# Patient Record
Sex: FEMALE | Race: WHITE | URBAN - NONMETROPOLITAN AREA
[De-identification: names, ages, dates, MRNs, and addresses within clinical notes are randomized per-mention and may not be internally consistent; named-entity substitution may affect disease eponyms.]

---

## 2021-08-30 ENCOUNTER — TELEPHONE (OUTPATIENT)
Dept: OBGYN CLINIC | Age: 21
End: 2021-08-30

## 2021-08-30 NOTE — TELEPHONE ENCOUNTER
Called pt and left a VM letting her know we did get the request for the medication refill,the and the last time we had seen her was 7/2020 and we needed to get you on the books before we can send that request in.

## 2021-08-30 NOTE — TELEPHONE ENCOUNTER
Refills denied at pharmacy until appointment scheduled. Will await further communication from patient.

## 2021-08-30 NOTE — TELEPHONE ENCOUNTER
Received a fax from [facility] [address] [facility] that patient is needing refills of her Sprintec. She was last seen 7/2020 and nothing scheduled for 2021. Can you please call her and schedule an annual appointment and I can talk to the provider about getting refills called in? Thanks.

## 2021-09-08 ENCOUNTER — TELEPHONE (OUTPATIENT)
Dept: OBGYN CLINIC | Age: 21
End: 2021-09-08

## 2021-09-08 RX ORDER — NORGESTIMATE AND ETHINYL ESTRADIOL 0.25-0.035
1 KIT ORAL DAILY
Qty: 1 PACKET | Refills: 1 | Status: SHIPPED | OUTPATIENT
Start: 2021-09-08 | End: 2021-10-18 | Stop reason: SDUPTHER

## 2021-09-08 NOTE — TELEPHONE ENCOUNTER
Pt called to sched annual. Needs a refill on meds until that time if possible. She uses [facility] [address] [facility]. Please and thanks.

## 2021-10-18 ENCOUNTER — OFFICE VISIT (OUTPATIENT)
Dept: OBGYN CLINIC | Age: 21
End: 2021-10-18
Payer: COMMERCIAL

## 2021-10-18 VITALS
BODY MASS INDEX: 29.64 KG/M2 | DIASTOLIC BLOOD PRESSURE: 82 MMHG | HEIGHT: 68 IN | WEIGHT: 195.6 LBS | SYSTOLIC BLOOD PRESSURE: 129 MMHG

## 2021-10-18 DIAGNOSIS — Z01.419 WOMEN'S ANNUAL ROUTINE GYNECOLOGICAL EXAMINATION: Primary | ICD-10-CM

## 2021-10-18 DIAGNOSIS — N92.6 IRREGULAR MENSES: ICD-10-CM

## 2021-10-18 PROCEDURE — G8484 FLU IMMUNIZE NO ADMIN: HCPCS | Performed by: NURSE PRACTITIONER

## 2021-10-18 PROCEDURE — 99395 PREV VISIT EST AGE 18-39: CPT | Performed by: NURSE PRACTITIONER

## 2021-10-18 RX ORDER — NORGESTIMATE AND ETHINYL ESTRADIOL 0.25-0.035
1 KIT ORAL DAILY
Qty: 3 PACKET | Refills: 3 | Status: SHIPPED | OUTPATIENT
Start: 2021-10-18 | End: 2022-10-03

## 2021-10-18 NOTE — PROGRESS NOTES
YEARLY PHYSICAL    Date of service: 10/18/2021    Saeed Palma  Is a 19 y.o.  female    PT's PCP is: No primary care provider on file. : 2000                                         Chaperone for Intimate Exam   Chaperone was offered as part of the rooming process. Patient declined and agrees to continue with exam without a chaperone.  Chaperone: n/a      Subjective:       Patient's last menstrual period was 10/04/2021. Are your menses regular: yes    OB History    Para Term  AB Living   0 0 0 0 0 0   SAB TAB Ectopic Molar Multiple Live Births   0 0 0 0 0 0        Social History     Tobacco Use   Smoking Status Never Smoker   Smokeless Tobacco Never Used        Social History     Substance and Sexual Activity   Alcohol Use Yes    Comment: rare       Family History   Problem Relation Age of Onset    Stroke Paternal Grandfather     Breast Cancer Paternal Aunt        Any family history of breast or ovarian cancer: Yes    Any family history of blood clots: No      Allergies: Patient has no known allergies. Current Outpatient Medications:     norgestimate-ethinyl estradiol (ORTHO-CYCLEN, 28,) 0.25-35 MG-MCG per tablet, Take 1 tablet by mouth daily, Disp: 3 packet, Rfl: 3    Social History     Substance and Sexual Activity   Sexual Activity Yes    Partners: Male    Birth control/protection: OCP       Any bleeding or pain with intercourse: No    Last Yearly:  2020    Last pap: 2020    Last HPV: n/a    Do you do self breast exams: Yes    History reviewed. No pertinent past medical history. History reviewed. No pertinent surgical history. Family History   Problem Relation Age of Onset    Stroke Paternal Grandfather     Breast Cancer Paternal Aunt        Chief Complaint   Patient presents with    Gynecologic Exam     Last pap 20. Reports regular cycles with OCP. Voices no concerns.  Declines std testing. PE:  Vital Signs  Blood pressure 129/82, height 5' 8\" (1.727 m), weight 195 lb 9.6 oz (88.7 kg), last menstrual period 10/04/2021, not currently breastfeeding. Estimated body mass index is 29.74 kg/m² as calculated from the following:    Height as of this encounter: 5' 8\" (1.727 m). Weight as of this encounter: 195 lb 9.6 oz (88.7 kg). HPI: Patient presents today for annual exam. Feeling well, voices no concerns. Denies breast/pelvic pain. Negative pap 7/2020. Wellness reviewed. Reports monthly menses with OCP. 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 Review of Systems   All other systems reviewed and are negative. Physical Exam  Constitutional:       General: She is not in acute distress. Appearance: Normal appearance. She is not ill-appearing. Genitourinary:      Vulva, cervix, uterus, right adnexa and left adnexa normal.      No vaginal discharge. Uterus is not tender. Right adnexa not tender. Left adnexa not tender. HENT:      Head: Normocephalic. Cardiovascular:      Rate and Rhythm: Normal rate and regular rhythm. Pulmonary:      Effort: Pulmonary effort is normal.      Breath sounds: Normal breath sounds. Chest:      Breasts:         Right: No mass, nipple discharge, skin change or tenderness. Left: No mass, nipple discharge, skin change or tenderness. Abdominal:      Palpations: Abdomen is soft. Tenderness: There is no abdominal tenderness. There is no guarding or rebound. Musculoskeletal:         General: Normal range of motion. Neurological:      General: No focal deficit present. Mental Status: She is alert. Psychiatric:         Mood and Affect: Mood normal.         Behavior: Behavior normal.                            Assessment and Plan          Diagnosis Orders   1. Women's annual routine gynecological examination     2.  Irregular menses  norgestimate-ethinyl estradiol (ORTHO-CYCLEN, 28,) 0.25-35 MG-MCG per tablet       Repeat Annual every 1 year  Cervical Cytology Evaluation begins at 24years old. If Negative Cytology, Follow-up screening per current guidelines. Mammograms every 1year. If 35 yo and last mammogram was negative. Routine healthmaintenance per patients PCP. I am having Steph Babin maintain her norgestimate-ethinyl estradiol. Return in about 1 year (around 10/18/2022) for yearly. She was also counseled on her preventative health maintenance recommendations and follow-up. There are no Patient Instructions on file for this visit.     NICKI Scott NP,10/18/2021 11:44 AM

## 2021-12-06 ENCOUNTER — OFFICE VISIT (OUTPATIENT)
Dept: OBGYN CLINIC | Age: 21
End: 2021-12-06
Payer: COMMERCIAL

## 2021-12-06 ENCOUNTER — HOSPITAL ENCOUNTER (OUTPATIENT)
Age: 21
Setting detail: SPECIMEN
Discharge: HOME | End: 2021-12-06

## 2021-12-06 VITALS — SYSTOLIC BLOOD PRESSURE: 127 MMHG | DIASTOLIC BLOOD PRESSURE: 83 MMHG | BODY MASS INDEX: 29.35 KG/M2 | WEIGHT: 193 LBS

## 2021-12-06 DIAGNOSIS — N83.291 COMPLEX CYST OF RIGHT OVARY: ICD-10-CM

## 2021-12-06 DIAGNOSIS — N83.291 COMPLEX CYST OF RIGHT OVARY: Primary | ICD-10-CM

## 2021-12-06 LAB — HCG QUANTITATIVE: <1 IU/L

## 2021-12-06 PROCEDURE — G8484 FLU IMMUNIZE NO ADMIN: HCPCS | Performed by: NURSE PRACTITIONER

## 2021-12-06 PROCEDURE — 1036F TOBACCO NON-USER: CPT | Performed by: NURSE PRACTITIONER

## 2021-12-06 PROCEDURE — 99213 OFFICE O/P EST LOW 20 MIN: CPT | Performed by: NURSE PRACTITIONER

## 2021-12-06 PROCEDURE — G8427 DOCREV CUR MEDS BY ELIG CLIN: HCPCS | Performed by: NURSE PRACTITIONER

## 2021-12-06 PROCEDURE — G8419 CALC BMI OUT NRM PARAM NOF/U: HCPCS | Performed by: NURSE PRACTITIONER

## 2021-12-06 NOTE — PROGRESS NOTES
PROBLEM VISIT     Date of service: 2021    Lorena Brasher  Is a 19 y.o. female    PT's PCP is: No primary care provider on file. : 2000                                             Subjective:       Patient's last menstrual period was 2021. OB History    Para Term  AB Living   0 0 0 0 0 0   SAB IAB Ectopic Molar Multiple Live Births   0 0 0 0 0 0        Social History     Tobacco Use   Smoking Status Never Smoker   Smokeless Tobacco Never Used        Social History     Substance and Sexual Activity   Alcohol Use Yes    Comment: rare       Allergies: Patient has no known allergies. Current Outpatient Medications:     norgestimate-ethinyl estradiol (ORTHO-CYCLEN, 28,) 0.25-35 MG-MCG per tablet, Take 1 tablet by mouth daily, Disp: 3 packet, Rfl: 3    Social History     Substance and Sexual Activity   Sexual Activity Yes    Partners: Male    Birth control/protection: OCP       Chief Complaint   Patient presents with    Follow-up     Follow up usn to recheck ovary. Reports was seen in ER recently for kidney stones and has only passed 1,         PE:  Vital Signs  Blood pressure 127/83, weight 193 lb (87.5 kg), last menstrual period 2021, not currently breastfeeding. HPI: Patient here following ultrasound. Recent CT scan performed for pain related to kidney stones revealed enlarged ovary. Denies any pelvic pain. Passed 1 of 3 kidney stones, no current pain. PT denies fever, chills, nausea and vomiting       Review of Systems   Constitutional: Negative. Gastrointestinal: Negative. Genitourinary: Negative. Physical Exam  Constitutional:       Appearance: Normal appearance. HENT:      Head: Normocephalic. Pulmonary:      Effort: Pulmonary effort is normal.   Musculoskeletal:         General: Normal range of motion. Neurological:      General: No focal deficit present. Mental Status: She is alert.    Psychiatric:         Mood and Affect: Mood normal.         Behavior: Behavior normal.        Uterus measures: 8.5 x 4.6  x 3.9 cm  Endometrium measures: 7.1 mm  Complex appearing area with blood flow is seen on the right ovary juwan: 2.3 x 0.9 x 1.9cm  Left ovary appears WNL    Assessment and Plan          Diagnosis Orders   1. Complex cyst of right ovary  hCG, Quantitative, Pregnancy       usn reviewed. Patient denies any current pain. Reviewed reportable s/s and when to seek care. I am having Radha Olivas maintain her norgestimate-ethinyl estradiol. Return in about 3 months (around 3/6/2022) for gyn US recheck cyst.    There are no Patient Instructions on file for this visit. Over 50% of time spent on counseling and care coordination on: see assessment and plan,  She was also counseled on her preventative health maintenance recommendations and follow-up.         FF time: 20 min      NICKI Lopez NP,12/13/2021 7:21 PM

## 2021-12-13 ASSESSMENT — ENCOUNTER SYMPTOMS: GASTROINTESTINAL NEGATIVE: 1

## 2022-03-08 ENCOUNTER — OFFICE VISIT (OUTPATIENT)
Dept: OBGYN CLINIC | Age: 22
End: 2022-03-08
Payer: COMMERCIAL

## 2022-03-08 VITALS — WEIGHT: 192 LBS | DIASTOLIC BLOOD PRESSURE: 82 MMHG | BODY MASS INDEX: 29.19 KG/M2 | SYSTOLIC BLOOD PRESSURE: 117 MMHG

## 2022-03-08 DIAGNOSIS — N83.291 COMPLEX CYST OF RIGHT OVARY: Primary | ICD-10-CM

## 2022-03-08 PROCEDURE — G8419 CALC BMI OUT NRM PARAM NOF/U: HCPCS | Performed by: NURSE PRACTITIONER

## 2022-03-08 PROCEDURE — G8484 FLU IMMUNIZE NO ADMIN: HCPCS | Performed by: NURSE PRACTITIONER

## 2022-03-08 PROCEDURE — G8427 DOCREV CUR MEDS BY ELIG CLIN: HCPCS | Performed by: NURSE PRACTITIONER

## 2022-03-08 PROCEDURE — 99213 OFFICE O/P EST LOW 20 MIN: CPT | Performed by: NURSE PRACTITIONER

## 2022-03-08 PROCEDURE — 1036F TOBACCO NON-USER: CPT | Performed by: NURSE PRACTITIONER

## 2022-03-08 NOTE — PROGRESS NOTES
PROBLEM VISIT     Date of service: 3/8/2022    Vignesh Chicas  Is a 19 y.o. female    PT's PCP is: No primary care provider on file. : 2000                                             Subjective:       Patient's last menstrual period was 2022. OB History    Para Term  AB Living   0 0 0 0 0 0   SAB IAB Ectopic Molar Multiple Live Births   0 0 0 0 0 0        Social History     Tobacco Use   Smoking Status Never Smoker   Smokeless Tobacco Never Used        Social History     Substance and Sexual Activity   Alcohol Use Yes    Comment: rare       Allergies: Patient has no known allergies. Current Outpatient Medications:     norgestimate-ethinyl estradiol (ORTHO-CYCLEN, 28,) 0.25-35 MG-MCG per tablet, Take 1 tablet by mouth daily, Disp: 3 packet, Rfl: 3    Social History     Substance and Sexual Activity   Sexual Activity Yes    Partners: Male    Birth control/protection: OCP       Chief Complaint   Patient presents with    Follow-up     Follow up usn for right ovarian cyst. Denies any pain. PE:  Vital Signs  Blood pressure 117/82, weight 192 lb (87.1 kg), last menstrual period 2022, not currently breastfeeding. HPI: Patient here following ultrasound to recheck complex right ovarian cyst. Denies any pelvic pain. PT denies fever, chills, nausea and vomiting       Review of Systems   Constitutional: Negative. Genitourinary: Negative. Physical Exam  Constitutional:       Appearance: Normal appearance. HENT:      Head: Normocephalic. Pulmonary:      Effort: Pulmonary effort is normal.   Musculoskeletal:         General: Normal range of motion. Neurological:      General: No focal deficit present. Mental Status: She is alert. Psychiatric:         Mood and Affect: Mood normal.         Behavior: Behavior normal.         Assessment and Plan          Diagnosis Orders   1. Complex cyst of right ovary         ultrasound reviewed with patient. ovaries WNL       I am having Juani Banuelos maintain her norgestimate-ethinyl estradiol. Return if symptoms worsen or fail to improve. There are no Patient Instructions on file for this visit. Over 50% of time spent on counseling and care coordination on: see assessment and plan,  She was also counseled on her preventative health maintenance recommendations and follow-up.         FF time: 20 min      Merrick Heimlich, APRN - ROBERT,3/8/2022 1:54 PM

## 2022-10-01 DIAGNOSIS — N92.6 IRREGULAR MENSES: ICD-10-CM

## 2022-10-03 ENCOUNTER — TELEPHONE (OUTPATIENT)
Dept: OBGYN CLINIC | Age: 22
End: 2022-10-03

## 2022-10-03 RX ORDER — NORGESTIMATE AND ETHINYL ESTRADIOL 0.25-0.035
1 KIT ORAL DAILY
Qty: 3 PACKET | Refills: 0 | Status: SHIPPED | OUTPATIENT
Start: 2022-10-03

## 2022-10-03 NOTE — TELEPHONE ENCOUNTER
Refills were sent this morning from an electronic request and patient called and changed her pharmacy. Refills sent to [facility] in [place].

## 2022-10-03 NOTE — TELEPHONE ENCOUNTER
Pt called to conf change of appt and will need one refill of meds sent to [facility] in [place]. Pt lives in [place] now. Please and thanks!

## 2023-01-04 ENCOUNTER — HOSPITAL ENCOUNTER (OUTPATIENT)
Age: 23
Setting detail: SPECIMEN
Discharge: HOME | End: 2023-01-04

## 2023-01-04 ENCOUNTER — OFFICE VISIT (OUTPATIENT)
Dept: OBGYN CLINIC | Age: 23
End: 2023-01-04
Payer: COMMERCIAL

## 2023-01-04 VITALS
SYSTOLIC BLOOD PRESSURE: 124 MMHG | BODY MASS INDEX: 29.13 KG/M2 | HEIGHT: 68 IN | WEIGHT: 192.2 LBS | DIASTOLIC BLOOD PRESSURE: 88 MMHG

## 2023-01-04 DIAGNOSIS — N92.6 IRREGULAR MENSES: ICD-10-CM

## 2023-01-04 DIAGNOSIS — Z01.419 VISIT FOR GYNECOLOGIC EXAMINATION: Primary | ICD-10-CM

## 2023-01-04 PROCEDURE — 99395 PREV VISIT EST AGE 18-39: CPT

## 2023-01-04 RX ORDER — NORGESTIMATE AND ETHINYL ESTRADIOL 0.25-0.035
KIT ORAL
Qty: 84 TABLET | Refills: 3 | Status: SHIPPED | OUTPATIENT
Start: 2023-01-04

## 2023-01-04 ASSESSMENT — ENCOUNTER SYMPTOMS
ABDOMINAL PAIN: 0
SHORTNESS OF BREATH: 0
DIARRHEA: 0
CONSTIPATION: 0

## 2023-01-04 NOTE — PROGRESS NOTES
YEARLY PHYSICAL    Date of service: 2023    Penny Wilson  Is a 23 y.o.  single female    PT's PCP is: No primary care provider on file. : 2000                                         Chaperone for Intimate Exam  Chaperone was offered as part of the rooming process. Patient declined and agrees to continue with exam without a chaperone. Subjective:       Patient's last menstrual period was 2022 (exact date). Are your menses regular: yes    OB History    Para Term  AB Living   0 0 0 0 0 0   SAB IAB Ectopic Molar Multiple Live Births   0 0 0 0 0 0        Social History     Tobacco Use   Smoking Status Never   Smokeless Tobacco Never        Social History     Substance and Sexual Activity   Alcohol Use Yes    Comment: rare       Family History   Problem Relation Age of Onset    Stroke Paternal Grandfather     Breast Cancer Paternal Aunt        Any family history of breast or ovarian cancer: Yes    Any family history of blood clots: No      Allergies: Patient has no known allergies. Current Outpatient Medications:     norgestimate-ethinyl estradiol (SPRINTEC 28) 0.25-35 MG-MCG per tablet, TAKE ONE TABLET BY MOUTH DAILY, Disp: 84 tablet, Rfl: 3    Social History     Substance and Sexual Activity   Sexual Activity Yes    Partners: Male    Birth control/protection: OCP       Any bleeding or pain with intercourse: No    Last Yearly:  10/18/21    Last pap: 2020 NL    Last HPV: n/a    Last Mammogram: never    Last Dexascan never    Last colorectal screen- type:never    Do you do self breast exams: Yes    Past Medical History:   Diagnosis Date    H/O renal calculi        No past surgical history on file.     Family History   Problem Relation Age of Onset    Stroke Paternal Grandfather     Breast Cancer Paternal Aunt        Chief Complaint   Patient presents with    Annual Exam     Here for annual exam. Last annual 10/18/21, last pap 7/2020 NL. No issues or concerns today. Needs refills on her OCP. PE:  Vital Signs  Blood pressure 124/88, height 5' 8\" (1.727 m), weight 192 lb 3.2 oz (87.2 kg), last menstrual period 12/07/2022, not currently breastfeeding. Estimated body mass index is 29.22 kg/m² as calculated from the following:    Height as of this encounter: 5' 8\" (1.727 m). Weight as of this encounter: 192 lb 3.2 oz (87.2 kg). Labs:    No results found for this visit on 01/04/23. No data recorded    NURSE: DAVID Santoyo RN    HPI: Patient presents for annual visit. Denies breast concerns. Encouraged SBE. Denies bowel/bladder concerns. Denies abnormal discharge, pain with intercourse. Pap 07/2020 WNL. Cycles well-controlled on OCP. Review of Systems   Constitutional:  Negative for chills, fatigue and fever. Respiratory:  Negative for shortness of breath. Cardiovascular:  Negative for chest pain. Gastrointestinal:  Negative for abdominal pain, constipation and diarrhea. Genitourinary:  Negative for dyspareunia, dysuria, frequency, menstrual problem, pelvic pain, urgency, vaginal bleeding and vaginal discharge. Neurological:  Negative for dizziness, light-headedness and headaches. Physical Exam  Constitutional:       Appearance: Normal appearance. Genitourinary:      Vulva normal.      Right Labia: No rash, tenderness or lesions. Left Labia: No tenderness, lesions or rash. Vaginal discharge present. No vaginal tenderness or bleeding. Right Adnexa: not tender and not full. Left Adnexa: not tender and not full. No cervical motion tenderness or discharge. Pelvic exam was performed with patient in the lithotomy position. Breasts:     Breasts are symmetrical.      Right: No inverted nipple, nipple discharge, skin change or tenderness. Left: No inverted nipple, nipple discharge, skin change or tenderness.    HENT:      Head: Normocephalic and atraumatic. Mouth/Throat:      Mouth: Mucous membranes are moist.   Eyes:      Extraocular Movements: Extraocular movements intact. Cardiovascular:      Rate and Rhythm: Normal rate. Pulmonary:      Effort: Pulmonary effort is normal.   Abdominal:      General: There is no distension. Palpations: Abdomen is soft. Tenderness: There is no abdominal tenderness. Musculoskeletal:         General: Normal range of motion. Cervical back: Normal range of motion. Neurological:      General: No focal deficit present. Mental Status: She is alert and oriented to person, place, and time. Skin:     General: Skin is warm and dry. Psychiatric:         Mood and Affect: Mood normal.         Behavior: Behavior normal.         Thought Content: Thought content normal.         Judgment: Judgment normal.   Chaperone present: chaperone declined. Assessment and Plan          Diagnosis Orders   1. Visit for gynecologic examination  PAP SMEAR      2. Irregular menses  norgestimate-ethinyl estradiol (SPRINTEC 28) 0.25-35 MG-MCG per tablet          Repeat Annual every 1 year  Cervical Cytology Evaluation begins at 24years old. If Negative Cytology, Follow-up screening per current guidelines. Mammograms every 1year. If 35 yo and last mammogram was negative. Routine healthmaintenance per patients PCP. I have changed Roque Henle 28 to norgestimate-ethinyl estradiol. Return in about 1 year (around 1/4/2024) for annual.    She was also counseled on her preventative health maintenance recommendations and follow-up. There are no Patient Instructions on file for this visit.     Emani Myers PA-C,1/4/2023 2:25 PM

## 2023-12-05 DIAGNOSIS — N92.6 IRREGULAR MENSES: ICD-10-CM

## 2023-12-06 RX ORDER — NORGESTIMATE AND ETHINYL ESTRADIOL 0.25-0.035
KIT ORAL
Qty: 84 TABLET | Refills: 0 | Status: SHIPPED | OUTPATIENT
Start: 2023-12-06 | End: 2024-01-08 | Stop reason: SDUPTHER

## 2024-01-08 ENCOUNTER — HOSPITAL ENCOUNTER (OUTPATIENT)
Age: 24
Setting detail: SPECIMEN
Discharge: HOME | End: 2024-01-08

## 2024-01-08 ENCOUNTER — OFFICE VISIT (OUTPATIENT)
Dept: OBGYN CLINIC | Age: 24
End: 2024-01-08
Payer: COMMERCIAL

## 2024-01-08 VITALS
HEIGHT: 68 IN | WEIGHT: 189.6 LBS | SYSTOLIC BLOOD PRESSURE: 120 MMHG | DIASTOLIC BLOOD PRESSURE: 70 MMHG | BODY MASS INDEX: 28.73 KG/M2

## 2024-01-08 DIAGNOSIS — Z01.419 WOMEN'S ANNUAL ROUTINE GYNECOLOGICAL EXAMINATION: Primary | ICD-10-CM

## 2024-01-08 DIAGNOSIS — N92.6 IRREGULAR MENSES: ICD-10-CM

## 2024-01-08 PROCEDURE — 99395 PREV VISIT EST AGE 18-39: CPT | Performed by: NURSE PRACTITIONER

## 2024-01-08 RX ORDER — NORGESTIMATE AND ETHINYL ESTRADIOL 0.25-0.035
KIT ORAL
Qty: 84 TABLET | Refills: 3 | Status: SHIPPED | OUTPATIENT
Start: 2024-01-08

## 2024-01-08 ASSESSMENT — ENCOUNTER SYMPTOMS
SHORTNESS OF BREATH: 0
ABDOMINAL PAIN: 0
CONSTIPATION: 0
DIARRHEA: 0

## 2024-01-08 NOTE — PROGRESS NOTES
YEARLY PHYSICAL    Date of service: 2024    Naomi Patel  Is a 23 y.o. female    PT's PCP is: No primary care provider on file.     : 2000                                         Chaperone for Intimate Exam  Chaperone was offered as part of the rooming process. Patient declined and agrees to continue with exam without a chaperone.  Chaperone: n/a      Subjective:       Patient's last menstrual period was 2023.     Are your menses regular: yes    OB History    Para Term  AB Living   0 0 0 0 0 0   SAB IAB Ectopic Molar Multiple Live Births   0 0 0 0 0 0        Social History     Tobacco Use   Smoking Status Never   Smokeless Tobacco Never        Social History     Substance and Sexual Activity   Alcohol Use Yes    Alcohol/week: 2.0 standard drinks of alcohol    Types: 2 Glasses of wine per week    Comment: rare       Family History   Problem Relation Age of Onset    Stroke Paternal Grandfather     Breast Cancer Paternal Aunt        Any family history of breast or ovarian cancer: Yes    Any family history of blood clots: No      Allergies: Patient has no known allergies.      Current Outpatient Medications:     norgestimate-ethinyl estradiol (SPRINTEC 28) 0.25-35 MG-MCG per tablet, TAKE ONE TABLET BY MOUTH DAILY, Disp: 84 tablet, Rfl: 3    Social History     Substance and Sexual Activity   Sexual Activity Yes    Partners: Male    Birth control/protection: OCP       Any bleeding or pain with intercourse: No    Last Yearly:  23    Last pap: 23-ASCUS    Last HPV: 23-positive    Last Mammogram: n/a    Last Dexascan n/a    Last colorectal screen- n/a    Do you do self breast exams: encouraged    Past Medical History:   Diagnosis Date    Abnormal Pap smear of cervix 1/10/2023    ((Pt Qnr Sub: ASCUS pos HPV)) A year ago    H/O renal calculi        History reviewed. No pertinent surgical history.    Family History

## 2024-01-23 LAB — CYTOLOGY REPORT: NORMAL

## 2024-02-28 ENCOUNTER — HOSPITAL ENCOUNTER (OUTPATIENT)
Age: 24
Setting detail: SPECIMEN
Discharge: HOME | End: 2024-02-28

## 2024-02-28 ENCOUNTER — PROCEDURE VISIT (OUTPATIENT)
Dept: OBGYN CLINIC | Age: 24
End: 2024-02-28

## 2024-02-28 VITALS — BODY MASS INDEX: 28.89 KG/M2 | WEIGHT: 190 LBS | SYSTOLIC BLOOD PRESSURE: 144 MMHG | DIASTOLIC BLOOD PRESSURE: 92 MMHG

## 2024-02-28 DIAGNOSIS — R87.612 LGSIL ON PAP SMEAR OF CERVIX: Primary | ICD-10-CM

## 2024-02-28 DIAGNOSIS — R87.810 CERVICAL HIGH RISK HUMAN PAPILLOMAVIRUS (HPV) DNA TEST POSITIVE: ICD-10-CM

## 2024-02-28 NOTE — PROGRESS NOTES
Colposcopy Procedure Note    Indications: Pap smear 1 months ago showed: low-grade squamous intraepithelial neoplasia (LGSIL - encompassing HPV,mild dysplasia,AZ I). The prior pap showed no abnormalities.  Prior cervical/vaginal disease: normal exam without visible pathology. Prior cervical treatment: no treatment.    Procedure Details   The risks and benefits of the procedure and Verbal informed consent obtained.    Speculum placed in vagina and excellent visualization of cervix achieved, cervix swabbed x 3 with acetic acid solution.    Findings:  Cervix: acetowhite lesion(s) noted at 1 o'clock; SCJ visualized - lesion at 1 o'clock, endocervical curettage performed, cervical biopsies taken at 1 o'clock, specimen labelled and sent to pathology, and hemostasis achieved with Monsel's solution.  Vaginal inspection: normal without visible lesions.  Vulvar colposcopy: vulvar colposcopy not performed.    Specimens: ecc, 1    Complications: none.    Plan:  Specimens labelled and sent to Pathology.  Will base further treatment on Pathology findings.

## 2024-03-02 LAB — SURGICAL PATHOLOGY REPORT: NORMAL

## 2025-01-09 ENCOUNTER — HOSPITAL ENCOUNTER (OUTPATIENT)
Age: 25
Setting detail: SPECIMEN
Discharge: HOME | End: 2025-01-09

## 2025-01-09 ENCOUNTER — OFFICE VISIT (OUTPATIENT)
Dept: OBGYN CLINIC | Age: 25
End: 2025-01-09
Payer: COMMERCIAL

## 2025-01-09 VITALS — BODY MASS INDEX: 29.44 KG/M2 | WEIGHT: 193.6 LBS

## 2025-01-09 DIAGNOSIS — Z01.419 WOMEN'S ANNUAL ROUTINE GYNECOLOGICAL EXAMINATION: Primary | ICD-10-CM

## 2025-01-09 DIAGNOSIS — N92.6 IRREGULAR MENSES: ICD-10-CM

## 2025-01-09 DIAGNOSIS — R87.612 LGSIL ON PAP SMEAR OF CERVIX: ICD-10-CM

## 2025-01-09 PROCEDURE — 99395 PREV VISIT EST AGE 18-39: CPT | Performed by: NURSE PRACTITIONER

## 2025-01-09 RX ORDER — NORGESTIMATE AND ETHINYL ESTRADIOL 0.25-0.035
KIT ORAL
Qty: 84 TABLET | Refills: 3 | Status: SHIPPED | OUTPATIENT
Start: 2025-01-09

## 2025-01-09 ASSESSMENT — ENCOUNTER SYMPTOMS
SHORTNESS OF BREATH: 0
ABDOMINAL PAIN: 0
CONSTIPATION: 0
DIARRHEA: 0

## 2025-01-09 NOTE — PROGRESS NOTES
YEARLY PHYSICAL    Date of service: 2025    Naomi Patel  Is a 24 y.o. female    PT's PCP is: No primary care provider on file.     : 2000                                         Chaperone for Intimate Exam  Chaperone was offered as part of the rooming process. Patient declined and agrees to continue with exam without a chaperone.  Chaperone: n/a      Subjective:       Patient's last menstrual period was 2024 (approximate).     Are your menses regular: yes    OB History    Para Term  AB Living   0 0 0 0 0 0   SAB IAB Ectopic Molar Multiple Live Births   0 0 0 0 0 0        Social History     Tobacco Use   Smoking Status Never   Smokeless Tobacco Never        Social History     Substance and Sexual Activity   Alcohol Use Yes    Alcohol/week: 2.0 standard drinks of alcohol    Types: 2 Glasses of wine per week    Comment: rare       Family History   Problem Relation Age of Onset    Stroke Paternal Grandfather     Breast Cancer Paternal Aunt        Any family history of breast or ovarian cancer: Yes    Any family history of blood clots: No      Allergies: Patient has no known allergies.      Current Outpatient Medications:     norgestimate-ethinyl estradiol (SPRINTEC 28) 0.25-35 MG-MCG per tablet, TAKE ONE TABLET BY MOUTH DAILY, Disp: 84 tablet, Rfl: 3    Social History     Substance and Sexual Activity   Sexual Activity Yes    Partners: Male    Birth control/protection: OCP       Any bleeding or pain with intercourse: No    Last Yearly:  24    Last pap: 24    Last HPV: +HR other 2023    Last Mammogram: n/a    Last Dexascan n/a    Do you do self breast exams: encouraged     Past Medical History:   Diagnosis Date    Abnormal Pap smear of cervix 1/10/2023    ((Pt Qnr Sub: ASCUS pos HPV)) A year ago    H/O renal calculi        History reviewed. No pertinent surgical history.    Family History   Problem Relation

## 2025-01-11 LAB
HPV I/H RISK 4 DNA CVX QL NAA+PROBE: NOT DETECTED
HPV SAMPLE: NORMAL
HPV, INTERPRETATION: NORMAL
HPV16 DNA CVX QL NAA+PROBE: NOT DETECTED
HPV18 DNA CVX QL NAA+PROBE: NOT DETECTED
SPECIMEN DESCRIPTION: NORMAL

## 2025-01-21 LAB — CYTOLOGY REPORT: NORMAL

## 2025-02-22 DIAGNOSIS — N92.6 IRREGULAR MENSES: ICD-10-CM

## 2025-02-24 RX ORDER — NORGESTIMATE AND ETHINYL ESTRADIOL 0.25-0.035
KIT ORAL
Qty: 84 TABLET | Refills: 3 | OUTPATIENT
Start: 2025-02-24